# Patient Record
Sex: FEMALE | Race: WHITE | Employment: FULL TIME | URBAN - METROPOLITAN AREA
[De-identification: names, ages, dates, MRNs, and addresses within clinical notes are randomized per-mention and may not be internally consistent; named-entity substitution may affect disease eponyms.]

---

## 2021-11-02 ENCOUNTER — APPOINTMENT (OUTPATIENT)
Dept: CT IMAGING | Age: 46
DRG: 167 | End: 2021-11-02
Payer: COMMERCIAL

## 2021-11-02 ENCOUNTER — HOSPITAL ENCOUNTER (INPATIENT)
Age: 46
LOS: 2 days | Discharge: HOME | DRG: 167 | End: 2021-11-04
Attending: EMERGENCY MEDICINE | Admitting: INTERNAL MEDICINE
Payer: COMMERCIAL

## 2021-11-02 DIAGNOSIS — I26.02 ACUTE SADDLE PULMONARY EMBOLISM WITH ACUTE COR PULMONALE (HCC): Primary | ICD-10-CM

## 2021-11-02 PROBLEM — I10 ESSENTIAL HYPERTENSION: Status: ACTIVE | Noted: 2021-11-02

## 2021-11-02 PROBLEM — R00.0 TACHYCARDIA: Status: ACTIVE | Noted: 2021-11-02

## 2021-11-02 LAB
A/G RATIO: 1.1 (ref 1.1–2.2)
ALBUMIN SERPL-MCNC: 4.2 G/DL (ref 3.4–5)
ALP BLD-CCNC: 113 U/L (ref 40–129)
ALT SERPL-CCNC: 15 U/L (ref 10–40)
ANION GAP SERPL CALCULATED.3IONS-SCNC: 18 MMOL/L (ref 3–16)
APTT: 34 SEC (ref 26.2–38.6)
APTT: 38 SEC (ref 26.2–38.6)
AST SERPL-CCNC: 19 U/L (ref 15–37)
BASOPHILS ABSOLUTE: 0 K/UL (ref 0–0.2)
BASOPHILS RELATIVE PERCENT: 0.2 %
BILIRUB SERPL-MCNC: 0.5 MG/DL (ref 0–1)
BUN BLDV-MCNC: 13 MG/DL (ref 7–20)
CALCIUM SERPL-MCNC: 9 MG/DL (ref 8.3–10.6)
CHLORIDE BLD-SCNC: 97 MMOL/L (ref 99–110)
CO2: 15 MMOL/L (ref 21–32)
CREAT SERPL-MCNC: 0.7 MG/DL (ref 0.6–1.1)
EKG ATRIAL RATE: 149 BPM
EKG DIAGNOSIS: NORMAL
EKG P AXIS: 54 DEGREES
EKG P-R INTERVAL: 100 MS
EKG Q-T INTERVAL: 278 MS
EKG QRS DURATION: 80 MS
EKG QTC CALCULATION (BAZETT): 437 MS
EKG R AXIS: 11 DEGREES
EKG T AXIS: -32 DEGREES
EKG VENTRICULAR RATE: 149 BPM
EOSINOPHILS ABSOLUTE: 0.1 K/UL (ref 0–0.6)
EOSINOPHILS RELATIVE PERCENT: 0.7 %
FIBRINOGEN: 374 MG/DL (ref 200–397)
FIBRINOGEN: 400 MG/DL (ref 200–397)
GFR AFRICAN AMERICAN: >60
GFR NON-AFRICAN AMERICAN: >60
GLUCOSE BLD-MCNC: 173 MG/DL (ref 70–99)
HCG QUALITATIVE: NEGATIVE
HCT VFR BLD CALC: 36.2 % (ref 36–48)
HCT VFR BLD CALC: 42.4 % (ref 36–48)
HEMOGLOBIN: 11.6 G/DL (ref 12–16)
HEMOGLOBIN: 13.7 G/DL (ref 12–16)
INR BLD: 1 (ref 0.88–1.12)
LACTIC ACID: 2.4 MMOL/L (ref 0.4–2)
LACTIC ACID: 3.6 MMOL/L (ref 0.4–2)
LV EF: 70 %
LVEF MODALITY: NORMAL
LYMPHOCYTES ABSOLUTE: 2.7 K/UL (ref 1–5.1)
LYMPHOCYTES RELATIVE PERCENT: 14.1 %
MCH RBC QN AUTO: 26.9 PG (ref 26–34)
MCH RBC QN AUTO: 27.1 PG (ref 26–34)
MCHC RBC AUTO-ENTMCNC: 32.2 G/DL (ref 31–36)
MCHC RBC AUTO-ENTMCNC: 32.3 G/DL (ref 31–36)
MCV RBC AUTO: 83.5 FL (ref 80–100)
MCV RBC AUTO: 84 FL (ref 80–100)
MONOCYTES ABSOLUTE: 0.8 K/UL (ref 0–1.3)
MONOCYTES RELATIVE PERCENT: 4.1 %
NEUTROPHILS ABSOLUTE: 15.6 K/UL (ref 1.7–7.7)
NEUTROPHILS RELATIVE PERCENT: 80.9 %
PDW BLD-RTO: 14.9 % (ref 12.4–15.4)
PDW BLD-RTO: 14.9 % (ref 12.4–15.4)
PLATELET # BLD: 214 K/UL (ref 135–450)
PLATELET # BLD: 249 K/UL (ref 135–450)
PMV BLD AUTO: 7.4 FL (ref 5–10.5)
PMV BLD AUTO: 7.7 FL (ref 5–10.5)
POTASSIUM REFLEX MAGNESIUM: 4.4 MMOL/L (ref 3.5–5.1)
PRO-BNP: 465 PG/ML (ref 0–124)
PROTHROMBIN TIME: 11.3 SEC (ref 9.9–12.7)
RBC # BLD: 4.33 M/UL (ref 4–5.2)
RBC # BLD: 5.05 M/UL (ref 4–5.2)
SARS-COV-2, NAAT: NOT DETECTED
SODIUM BLD-SCNC: 130 MMOL/L (ref 136–145)
TOTAL PROTEIN: 8 G/DL (ref 6.4–8.2)
TROPONIN: 0.08 NG/ML
TSH REFLEX: 1.36 UIU/ML (ref 0.27–4.2)
WBC # BLD: 13.6 K/UL (ref 4–11)
WBC # BLD: 19.2 K/UL (ref 4–11)

## 2021-11-02 PROCEDURE — 2580000003 HC RX 258: Performed by: PHYSICIAN ASSISTANT

## 2021-11-02 PROCEDURE — 02FQ3Z0 FRAGMENTATION OF RIGHT PULMONARY ARTERY, PERCUTANEOUS APPROACH, ULTRASONIC: ICD-10-PCS | Performed by: SURGERY

## 2021-11-02 PROCEDURE — C1894 INTRO/SHEATH, NON-LASER: HCPCS

## 2021-11-02 PROCEDURE — 80053 COMPREHEN METABOLIC PANEL: CPT

## 2021-11-02 PROCEDURE — 3E03317 INTRODUCTION OF OTHER THROMBOLYTIC INTO PERIPHERAL VEIN, PERCUTANEOUS APPROACH: ICD-10-PCS | Performed by: SURGERY

## 2021-11-02 PROCEDURE — 85027 COMPLETE CBC AUTOMATED: CPT

## 2021-11-02 PROCEDURE — 06HM33Z INSERTION OF INFUSION DEVICE INTO RIGHT FEMORAL VEIN, PERCUTANEOUS APPROACH: ICD-10-PCS | Performed by: SURGERY

## 2021-11-02 PROCEDURE — 2709999900 HC NON-CHARGEABLE SUPPLY

## 2021-11-02 PROCEDURE — B31S1ZZ FLUOROSCOPY OF RIGHT PULMONARY ARTERY USING LOW OSMOLAR CONTRAST: ICD-10-PCS | Performed by: SURGERY

## 2021-11-02 PROCEDURE — 99152 MOD SED SAME PHYS/QHP 5/>YRS: CPT

## 2021-11-02 PROCEDURE — C1751 CATH, INF, PER/CENT/MIDLINE: HCPCS

## 2021-11-02 PROCEDURE — 36415 COLL VENOUS BLD VENIPUNCTURE: CPT

## 2021-11-02 PROCEDURE — 93005 ELECTROCARDIOGRAM TRACING: CPT | Performed by: EMERGENCY MEDICINE

## 2021-11-02 PROCEDURE — B54BZZA ULTRASONOGRAPHY OF RIGHT LOWER EXTREMITY VEINS, GUIDANCE: ICD-10-PCS | Performed by: SURGERY

## 2021-11-02 PROCEDURE — 85384 FIBRINOGEN ACTIVITY: CPT

## 2021-11-02 PROCEDURE — 6360000002 HC RX W HCPCS: Performed by: SURGERY

## 2021-11-02 PROCEDURE — 2100000000 HC CCU R&B

## 2021-11-02 PROCEDURE — 99283 EMERGENCY DEPT VISIT LOW MDM: CPT

## 2021-11-02 PROCEDURE — 6370000000 HC RX 637 (ALT 250 FOR IP): Performed by: SURGERY

## 2021-11-02 PROCEDURE — 75746 ARTERY X-RAYS LUNG: CPT | Performed by: SURGERY

## 2021-11-02 PROCEDURE — 85730 THROMBOPLASTIN TIME PARTIAL: CPT

## 2021-11-02 PROCEDURE — 75820 VEIN X-RAY ARM/LEG: CPT

## 2021-11-02 PROCEDURE — 99153 MOD SED SAME PHYS/QHP EA: CPT

## 2021-11-02 PROCEDURE — 93010 ELECTROCARDIOGRAM REPORT: CPT | Performed by: INTERNAL MEDICINE

## 2021-11-02 PROCEDURE — APPSS60 APP SPLIT SHARED TIME 46-60 MINUTES: Performed by: NURSE PRACTITIONER

## 2021-11-02 PROCEDURE — 75746 ARTERY X-RAYS LUNG: CPT

## 2021-11-02 PROCEDURE — 6360000002 HC RX W HCPCS: Performed by: PHYSICIAN ASSISTANT

## 2021-11-02 PROCEDURE — 83880 ASSAY OF NATRIURETIC PEPTIDE: CPT

## 2021-11-02 PROCEDURE — 36014 PLACE CATHETER IN ARTERY: CPT | Performed by: SURGERY

## 2021-11-02 PROCEDURE — 84443 ASSAY THYROID STIM HORMONE: CPT

## 2021-11-02 PROCEDURE — 83605 ASSAY OF LACTIC ACID: CPT

## 2021-11-02 PROCEDURE — 2500000003 HC RX 250 WO HCPCS: Performed by: EMERGENCY MEDICINE

## 2021-11-02 PROCEDURE — 2500000003 HC RX 250 WO HCPCS

## 2021-11-02 PROCEDURE — U0003 INFECTIOUS AGENT DETECTION BY NUCLEIC ACID (DNA OR RNA); SEVERE ACUTE RESPIRATORY SYNDROME CORONAVIRUS 2 (SARS-COV-2) (CORONAVIRUS DISEASE [COVID-19]), AMPLIFIED PROBE TECHNIQUE, MAKING USE OF HIGH THROUGHPUT TECHNOLOGIES AS DESCRIBED BY CMS-2020-01-R: HCPCS

## 2021-11-02 PROCEDURE — 96361 HYDRATE IV INFUSION ADD-ON: CPT

## 2021-11-02 PROCEDURE — 96374 THER/PROPH/DIAG INJ IV PUSH: CPT

## 2021-11-02 PROCEDURE — 6360000002 HC RX W HCPCS

## 2021-11-02 PROCEDURE — 99152 MOD SED SAME PHYS/QHP 5/>YRS: CPT | Performed by: SURGERY

## 2021-11-02 PROCEDURE — 2580000003 HC RX 258: Performed by: EMERGENCY MEDICINE

## 2021-11-02 PROCEDURE — 71260 CT THORAX DX C+: CPT

## 2021-11-02 PROCEDURE — 84703 CHORIONIC GONADOTROPIN ASSAY: CPT

## 2021-11-02 PROCEDURE — 99253 IP/OBS CNSLTJ NEW/EST LOW 45: CPT | Performed by: SURGERY

## 2021-11-02 PROCEDURE — 6360000002 HC RX W HCPCS: Performed by: INTERNAL MEDICINE

## 2021-11-02 PROCEDURE — 6360000004 HC RX CONTRAST MEDICATION: Performed by: SURGERY

## 2021-11-02 PROCEDURE — 84484 ASSAY OF TROPONIN QUANT: CPT

## 2021-11-02 PROCEDURE — 87635 SARS-COV-2 COVID-19 AMP PRB: CPT

## 2021-11-02 PROCEDURE — 93306 TTE W/DOPPLER COMPLETE: CPT

## 2021-11-02 PROCEDURE — 36013 PLACE CATHETER IN ARTERY: CPT

## 2021-11-02 PROCEDURE — U0005 INFEC AGEN DETEC AMPLI PROBE: HCPCS

## 2021-11-02 PROCEDURE — 6370000000 HC RX 637 (ALT 250 FOR IP): Performed by: INTERNAL MEDICINE

## 2021-11-02 PROCEDURE — 02FR3Z0 FRAGMENTATION OF LEFT PULMONARY ARTERY, PERCUTANEOUS APPROACH, ULTRASONIC: ICD-10-PCS | Performed by: SURGERY

## 2021-11-02 PROCEDURE — B31T1ZZ FLUOROSCOPY OF LEFT PULMONARY ARTERY USING LOW OSMOLAR CONTRAST: ICD-10-PCS | Performed by: SURGERY

## 2021-11-02 PROCEDURE — 2580000003 HC RX 258

## 2021-11-02 PROCEDURE — APPNB30 APP NON BILLABLE TIME 0-30 MINS: Performed by: NURSE PRACTITIONER

## 2021-11-02 PROCEDURE — 37211 THROMBOLYTIC ART THERAPY: CPT

## 2021-11-02 PROCEDURE — 37211 THROMBOLYTIC ART THERAPY: CPT | Performed by: SURGERY

## 2021-11-02 PROCEDURE — 76937 US GUIDE VASCULAR ACCESS: CPT | Performed by: SURGERY

## 2021-11-02 PROCEDURE — 2000000000 HC ICU R&B

## 2021-11-02 PROCEDURE — 85610 PROTHROMBIN TIME: CPT

## 2021-11-02 PROCEDURE — 6360000004 HC RX CONTRAST MEDICATION: Performed by: EMERGENCY MEDICINE

## 2021-11-02 PROCEDURE — 96375 TX/PRO/DX INJ NEW DRUG ADDON: CPT

## 2021-11-02 PROCEDURE — 85025 COMPLETE CBC W/AUTO DIFF WBC: CPT

## 2021-11-02 PROCEDURE — C1769 GUIDE WIRE: HCPCS

## 2021-11-02 PROCEDURE — 2580000003 HC RX 258: Performed by: SURGERY

## 2021-11-02 RX ORDER — HEPARIN SODIUM 10000 [USP'U]/100ML
5-30 INJECTION, SOLUTION INTRAVENOUS CONTINUOUS
Status: CANCELLED | OUTPATIENT
Start: 2021-11-02

## 2021-11-02 RX ORDER — HEPARIN SODIUM 1000 [USP'U]/ML
80 INJECTION, SOLUTION INTRAVENOUS; SUBCUTANEOUS ONCE
Status: COMPLETED | OUTPATIENT
Start: 2021-11-02 | End: 2021-11-02

## 2021-11-02 RX ORDER — DILTIAZEM HYDROCHLORIDE 5 MG/ML
INJECTION INTRAVENOUS
Status: DISCONTINUED
Start: 2021-11-02 | End: 2021-11-02

## 2021-11-02 RX ORDER — SODIUM CHLORIDE 9 MG/ML
INJECTION, SOLUTION INTRAVENOUS CONTINUOUS
Status: DISCONTINUED | OUTPATIENT
Start: 2021-11-02 | End: 2021-11-03

## 2021-11-02 RX ORDER — ACETAMINOPHEN 325 MG/1
650 TABLET ORAL EVERY 6 HOURS PRN
Status: DISCONTINUED | OUTPATIENT
Start: 2021-11-02 | End: 2021-11-04 | Stop reason: HOSPADM

## 2021-11-02 RX ORDER — HEPARIN SODIUM 10000 [USP'U]/100ML
5-30 INJECTION, SOLUTION INTRAVENOUS CONTINUOUS
Status: DISCONTINUED | OUTPATIENT
Start: 2021-11-02 | End: 2021-11-02

## 2021-11-02 RX ORDER — HEPARIN SODIUM 1000 [USP'U]/ML
40 INJECTION, SOLUTION INTRAVENOUS; SUBCUTANEOUS PRN
Status: DISCONTINUED | OUTPATIENT
Start: 2021-11-02 | End: 2021-11-04

## 2021-11-02 RX ORDER — 0.9 % SODIUM CHLORIDE 0.9 %
1000 INTRAVENOUS SOLUTION INTRAVENOUS ONCE
Status: COMPLETED | OUTPATIENT
Start: 2021-11-02 | End: 2021-11-02

## 2021-11-02 RX ORDER — MORPHINE SULFATE 4 MG/ML
4 INJECTION, SOLUTION INTRAMUSCULAR; INTRAVENOUS
Status: DISCONTINUED | OUTPATIENT
Start: 2021-11-02 | End: 2021-11-04 | Stop reason: HOSPADM

## 2021-11-02 RX ORDER — HEPARIN SODIUM 1000 [USP'U]/ML
80 INJECTION, SOLUTION INTRAVENOUS; SUBCUTANEOUS PRN
Status: DISCONTINUED | OUTPATIENT
Start: 2021-11-02 | End: 2021-11-02

## 2021-11-02 RX ORDER — HEPARIN SODIUM 1000 [USP'U]/ML
80 INJECTION, SOLUTION INTRAVENOUS; SUBCUTANEOUS PRN
Status: CANCELLED | OUTPATIENT
Start: 2021-11-02

## 2021-11-02 RX ORDER — ONDANSETRON 2 MG/ML
4 INJECTION INTRAMUSCULAR; INTRAVENOUS EVERY 30 MIN PRN
Status: DISCONTINUED | OUTPATIENT
Start: 2021-11-02 | End: 2021-11-02

## 2021-11-02 RX ORDER — CETIRIZINE HYDROCHLORIDE 10 MG/1
10 TABLET ORAL DAILY
COMMUNITY

## 2021-11-02 RX ORDER — HEPARIN SODIUM 10000 [USP'U]/100ML
250 INJECTION, SOLUTION INTRAVENOUS CONTINUOUS
Status: DISCONTINUED | OUTPATIENT
Start: 2021-11-02 | End: 2021-11-04

## 2021-11-02 RX ORDER — ONDANSETRON 2 MG/ML
4 INJECTION INTRAMUSCULAR; INTRAVENOUS EVERY 6 HOURS PRN
Status: DISCONTINUED | OUTPATIENT
Start: 2021-11-02 | End: 2021-11-04 | Stop reason: HOSPADM

## 2021-11-02 RX ORDER — HYDROCODONE BITARTRATE AND ACETAMINOPHEN 5; 325 MG/1; MG/1
1 TABLET ORAL EVERY 6 HOURS PRN
Status: DISCONTINUED | OUTPATIENT
Start: 2021-11-02 | End: 2021-11-04 | Stop reason: HOSPADM

## 2021-11-02 RX ORDER — DILTIAZEM HYDROCHLORIDE 5 MG/ML
0.25 INJECTION INTRAVENOUS ONCE
Status: COMPLETED | OUTPATIENT
Start: 2021-11-02 | End: 2021-11-02

## 2021-11-02 RX ORDER — SODIUM CHLORIDE 0.9 % (FLUSH) 0.9 %
5-40 SYRINGE (ML) INJECTION PRN
Status: DISCONTINUED | OUTPATIENT
Start: 2021-11-02 | End: 2021-11-04 | Stop reason: HOSPADM

## 2021-11-02 RX ORDER — SODIUM CHLORIDE 0.9 % (FLUSH) 0.9 %
5-40 SYRINGE (ML) INJECTION EVERY 12 HOURS SCHEDULED
Status: DISCONTINUED | OUTPATIENT
Start: 2021-11-02 | End: 2021-11-04 | Stop reason: HOSPADM

## 2021-11-02 RX ORDER — LISINOPRIL 20 MG/1
20 TABLET ORAL DAILY
Status: DISCONTINUED | OUTPATIENT
Start: 2021-11-03 | End: 2021-11-04 | Stop reason: HOSPADM

## 2021-11-02 RX ORDER — HEPARIN SODIUM 1000 [USP'U]/ML
40 INJECTION, SOLUTION INTRAVENOUS; SUBCUTANEOUS PRN
Status: DISCONTINUED | OUTPATIENT
Start: 2021-11-02 | End: 2021-11-02

## 2021-11-02 RX ORDER — SODIUM CHLORIDE 9 MG/ML
25 INJECTION, SOLUTION INTRAVENOUS PRN
Status: DISCONTINUED | OUTPATIENT
Start: 2021-11-02 | End: 2021-11-04 | Stop reason: HOSPADM

## 2021-11-02 RX ORDER — ACETAMINOPHEN 650 MG/1
650 SUPPOSITORY RECTAL EVERY 6 HOURS PRN
Status: DISCONTINUED | OUTPATIENT
Start: 2021-11-02 | End: 2021-11-04 | Stop reason: HOSPADM

## 2021-11-02 RX ORDER — HYDROCODONE BITARTRATE AND ACETAMINOPHEN 5; 325 MG/1; MG/1
1 TABLET ORAL EVERY 6 HOURS PRN
Status: DISCONTINUED | OUTPATIENT
Start: 2021-11-02 | End: 2021-11-02

## 2021-11-02 RX ORDER — POLYETHYLENE GLYCOL 3350 17 G/17G
17 POWDER, FOR SOLUTION ORAL DAILY PRN
Status: DISCONTINUED | OUTPATIENT
Start: 2021-11-02 | End: 2021-11-04 | Stop reason: HOSPADM

## 2021-11-02 RX ORDER — LISINOPRIL 20 MG/1
20 TABLET ORAL DAILY
COMMUNITY
Start: 2021-09-10

## 2021-11-02 RX ORDER — HEPARIN SODIUM 1000 [USP'U]/ML
40 INJECTION, SOLUTION INTRAVENOUS; SUBCUTANEOUS PRN
Status: CANCELLED | OUTPATIENT
Start: 2021-11-02

## 2021-11-02 RX ORDER — ONDANSETRON 4 MG/1
4 TABLET, ORALLY DISINTEGRATING ORAL EVERY 8 HOURS PRN
Status: DISCONTINUED | OUTPATIENT
Start: 2021-11-02 | End: 2021-11-04 | Stop reason: HOSPADM

## 2021-11-02 RX ORDER — HEPARIN SODIUM 10000 [USP'U]/100ML
5-30 INJECTION, SOLUTION INTRAVENOUS CONTINUOUS
Status: DISCONTINUED | OUTPATIENT
Start: 2021-11-03 | End: 2021-11-04

## 2021-11-02 RX ORDER — MORPHINE SULFATE 2 MG/ML
2 INJECTION, SOLUTION INTRAMUSCULAR; INTRAVENOUS
Status: DISCONTINUED | OUTPATIENT
Start: 2021-11-02 | End: 2021-11-04 | Stop reason: HOSPADM

## 2021-11-02 RX ADMIN — SODIUM CHLORIDE: 9 INJECTION, SOLUTION INTRAVENOUS at 19:52

## 2021-11-02 RX ADMIN — SODIUM CHLORIDE: 9 INJECTION, SOLUTION INTRAVENOUS at 16:55

## 2021-11-02 RX ADMIN — SODIUM CHLORIDE 1000 ML: 9 INJECTION, SOLUTION INTRAVENOUS at 11:52

## 2021-11-02 RX ADMIN — IOPAMIDOL 75 ML: 755 INJECTION, SOLUTION INTRAVENOUS at 12:40

## 2021-11-02 RX ADMIN — HYDROCODONE BITARTRATE AND ACETAMINOPHEN 1 TABLET: 5; 325 TABLET ORAL at 21:06

## 2021-11-02 RX ADMIN — MORPHINE SULFATE 2 MG: 2 INJECTION, SOLUTION INTRAMUSCULAR; INTRAVENOUS at 22:57

## 2021-11-02 RX ADMIN — ONDANSETRON 4 MG: 2 INJECTION INTRAMUSCULAR; INTRAVENOUS at 10:34

## 2021-11-02 RX ADMIN — ONDANSETRON 4 MG: 4 TABLET, ORALLY DISINTEGRATING ORAL at 21:10

## 2021-11-02 RX ADMIN — IOPAMIDOL 30 ML: 755 INJECTION, SOLUTION INTRAVENOUS at 17:06

## 2021-11-02 RX ADMIN — SODIUM CHLORIDE: 9 INJECTION, SOLUTION INTRAVENOUS at 17:01

## 2021-11-02 RX ADMIN — HEPARIN SODIUM 6350 UNITS: 1000 INJECTION INTRAVENOUS; SUBCUTANEOUS at 13:59

## 2021-11-02 RX ADMIN — DILTIAZEM HYDROCHLORIDE 20 MG: 5 INJECTION INTRAVENOUS at 11:23

## 2021-11-02 RX ADMIN — HEPARIN SODIUM AND DEXTROSE 250 UNITS/HR: 10000; 5 INJECTION INTRAVENOUS at 17:00

## 2021-11-02 RX ADMIN — ALTEPLASE 1 MG: KIT at 17:15

## 2021-11-02 RX ADMIN — ACETAMINOPHEN 650 MG: 325 TABLET ORAL at 19:48

## 2021-11-02 RX ADMIN — SODIUM CHLORIDE 1000 ML: 9 INJECTION, SOLUTION INTRAVENOUS at 10:32

## 2021-11-02 ASSESSMENT — PAIN DESCRIPTION - PAIN TYPE
TYPE: ACUTE PAIN

## 2021-11-02 ASSESSMENT — PAIN DESCRIPTION - LOCATION
LOCATION: BACK

## 2021-11-02 ASSESSMENT — ENCOUNTER SYMPTOMS
RHINORRHEA: 0
SORE THROAT: 0
DIARRHEA: 0
ABDOMINAL PAIN: 0
VOMITING: 0
COUGH: 0
NAUSEA: 1
BACK PAIN: 0
EYE PAIN: 0
SHORTNESS OF BREATH: 0
CONSTIPATION: 0

## 2021-11-02 ASSESSMENT — PAIN SCALES - GENERAL
PAINLEVEL_OUTOF10: 8
PAINLEVEL_OUTOF10: 9
PAINLEVEL_OUTOF10: 8
PAINLEVEL_OUTOF10: 8

## 2021-11-02 NOTE — PROGRESS NOTES
Gerald Walters   Patient: Ivon Singleton     11/2/21 6:45 PM   [phone] Hospital or Facility: [facility] From: Marissa Bejarano RE: Rita Todd RM: [id number] Patient just came from cath lab with EKOS. Patient complaining of back pain due to lying flat on back. Nothing ordered for pain.  Need Callback: NEEDS CALLBACK CVU  Read 6:53 PM

## 2021-11-02 NOTE — CONSULTS
[facility]  Consultation Note    Chief Complaint / Reason for Consultation  Saddle PE    History of Present Illness  Patient is a 42 y.o. female with past medical history of hypertension who presented to the ED with complaints of SOB, dizziness, nausea and fast heart beat. Patient reports this weekend she was not feeling well and laying around more. Today when she woke up she began sweating and was dizzy along with nausea. She complains of some dyspnea with exertion and chest discomfort. She reports her heart felt like it was beating very fast.  She also reports some right groin and thigh pain this morning as well. Patient does report lower extremity swelling but relates it to being on her feet for her job on a daily bases. She denies any recent surgery or travel. She denies any personal or family history of blood clots or clotting disorders. She denies tobacco use. She denies any current estrogen use, was on previously. She has not been vaccinated against COVID-19. In ED, CTPE chest showed acute PE with saddle embolus across the main pulmonary artery with evidence of RV strain. We have been consulted for further evaluation and recommendations. Review of Systems  + nausea, + SOB, + dizziness, + heart palpitations. Denies fevers, chills, vomiting, hematemesis, diarrhea, constipation, melena, hematochezia, wt changes, vision problems, blindness, hearing problems, facial droop, slurred speech, extremity weakness, extremity numbness, dysuria. Past Medical History:   Diagnosis Date    Hypertension        History reviewed. No pertinent surgical history.     No Known Allergies    Social History     Socioeconomic History    Marital status:      Spouse name: Not on file    Number of children: Not on file    Years of education: Not on file    Highest education level: Not on file   Occupational History    Not on file   Tobacco Use    Smoking status: Never Smoker Substance and Sexual Activity    Alcohol use: Not Currently    Drug use: Never    Sexual activity: Not on file   Other Topics Concern    Not on file   Social History Narrative    Not on file     Social Determinants of Health     Financial Resource Strain:     Difficulty of Paying Living Expenses:    Food Insecurity:     Worried About Running Out of Food in the Last Year:     [address] in the Last Year:    Transportation Needs:     Lack of Transportation (Medical):  Lack of Transportation (Non-Medical):    Physical Activity:     Days of Exercise per Week:     Minutes of Exercise per Session:    Stress:     Feeling of Stress :    Social Connections:     Frequency of Communication with Friends and Family:     Frequency of Social Gatherings with Friends and Family:     Attends [employer]oration[employer] Services:     Active Member of Clubs or Organizations:     Attends Club or Organization Meetings:     Marital Status:    Intimate Partner Violence:     Fear of Current or Ex-Partner:     Emotionally Abused:     Physically Abused:     Sexually Abused:        History reviewed.  No pertinent family history.  - No history of bleeding or clotting disorders    Vital Signs  Vitals:    11/02/21 1130 11/02/21 1145 11/02/21 1200 11/02/21 1215   BP: 104/79 104/69 98/76 106/78   Pulse: 138 136 132 130   Resp: (!) 32 26 27 26   Temp:       SpO2: 97% 93% 95% 97%   Weight:       Height:           Physical Examination  General: no apparent distress, appears stated age  Psychiatric: affect appropriate  Head/Eyes/Ears/Nose/Throat:  Normocephalic, atraumatic, PERRLA, face symmetric  Neck:  no adenopathy, no carotid bruit, no JVD, supple, symmetrical, trachea midline, thyroid not enlarged, no tenderness/mass/nodules  Chest/Lungs: clear to auscultation bilaterally, no accessory muscle use  Cardiac:  sinus tachycardia, S1, S2 normal, no murmur, click, rub or gallop  Abdomen: soft, nontender, active bowel sounds  Extremities: warm and well perfused, no signs of cyanosis or ischemia, trace BLE edema, bilateral upper and lower extremity motorsensory intact  Vascular exam:  - R radial: 2+  - L radial: 2+  - R femoral: 2+  - L femoral: 2+  - R PT: 2+  - L PT: 2+    Labs  Lab Results   Component Value Date    WBC 19.2 11/02/2021    HGB 13.7 11/02/2021    HCT 42.4 11/02/2021    MCV 84.0 11/02/2021     11/02/2021     Lab Results   Component Value Date     11/02/2021    K 4.4 11/02/2021    CL 97 11/02/2021    CO2 15 11/02/2021    BUN 13 11/02/2021    CREATININE 0.7 11/02/2021      No components found for: GLU    Scheduled Meds:    dilTIAZem        heparin (porcine)  80 Units/kg IntraVENous Once     Continuous Infusions:    heparin (PORCINE) Infusion         Imaging:     CTPE chest 11/2/21:  Impression   Acute pulmonary embolism with a saddle embolus noted across the main   pulmonary artery.       Evidence of right heart strain with the LV to RV ratio 1.7.       No evidence of acute lung parenchyma or pleural disease. Assessment:   Acute saddle PE with RV strain - sinus tachycardia 130s, on room air, no hypotension at this time; appears unprovoked   BLE swelling, right groin/thigh pain    Plan: Will give initial heparin weight based bolus. Hold on starting heparin drip. Will plan for catheter directed thrombolysis (EKOS) this afternoon with Dr. Mellisa Rivers. Keep NPO. ECHO ordered. Will send rapid COVID test and pregnancy test.  Add on fibrinogen level. BLE venous duplex ordered. Will need CVU bed. Plan discussed with Dr. Mellisa Rivers. Thank you for the consultation. Patient educated on plan of care and disease process. All questions answered. Electronically signed by SANDRA Caldwell CNP on 11/2/2021 at 1:30 PM     VASCULAR STAFF  Seen and discussed with A Jakob CNP. Agree with above history, exam and assessment. Separate exam and evaluation performed.    IMP: Acute PE with RV strain and respiratory compromise  PLAN: Initiation of catheter directed EKOS thrombolysis. Pt understands and agrees to proceed.     Kj Julian

## 2021-11-02 NOTE — ED NOTES
Bed: 23  Expected date:   Expected time:   Means of arrival:   Comments:  Yana Faith RN  11/02/21 1027

## 2021-11-02 NOTE — ED NOTES
Pharmacy Medication History Note      List of current medications patient is taking is complete. Source of information: Self    Changes made to medication list:  Medications flagged for removal (include reason, ex. noncompliance):  none    Medications removed (include reason, ex. therapy complete or physician discontinued):  none    Medications added/doses adjusted:  Cetirizine 10 mg daily    Other notes (ex. Recent course of antibiotics, Coumadin dosing):  Denies use of other OTC or herbal medications. Last dose times updated. Betina KeyesD  ED Pharmacist [id number]  11/2/2021    No current facility-administered medications on file prior to encounter.      Current Outpatient Medications on File Prior to Encounter   Medication Sig Dispense Refill    lisinopril (PRINIVIL;ZESTRIL) 20 MG tablet Take 20 mg by mouth daily       Norethin Ace-Eth Estrad-FE (SHARRI FE 1/20 PO) Take 1 tablet by mouth daily      cetirizine (ZYRTEC) 10 MG tablet Take 10 mg by mouth daily

## 2021-11-02 NOTE — ED PROVIDER NOTES
[facility] [facility]        Pt Name: Michael Mcneal  MRN: [id number]  [place] 1975  Date of evaluation: 11/2/2021  Provider: Sharon Shipley PA-C  PCP: Cynthia Baig  Note Started: 1:40 PM EDT       I have seen and evaluated this patient with my supervising physician Derrick Perkins MD.      [address]       Chief Complaint   Patient presents with    Palpitations     states she was making breakfast this am, and became dizzy and sweaty, having palpitations    Dizziness         HISTORY OF PRESENT ILLNESS   (Location/Symptom, Timing/Onset, Context/Setting, Quality, Duration, Modifying Factors, Severity)  Note limiting factors. Chief Complaint: Dizziness and palpitations    Michael Mcneal is a 42 y.o. female who presents to the emergency department, the patient states that she wasn't feeling well over the weekend, she had a lot of increased sleep, today she was standing while she was cooking and she felt suddenly dizzy and very nauseated and very sweaty. She states that she had some mild chills, she does admit to some mild back pain. She states that the lightheadedness as if she was going to pass out. She also states that she felt her heart is racing or palpitating. Denies any recent runny nose, cough or congestion, denies any illnesses. Patient states that she is not vaccinated for COVID-19. She denies any chest pain, admits to very mild to moderate discomfort. States that she just doesn't feel well. Nursing Notes were all reviewed and agreed with or any disagreements were addressed in the HPI. REVIEW OF SYSTEMS    (2-9 systems for level 4, 10 or more for level 5)     Review of Systems   Constitutional: Positive for diaphoresis and fatigue. Negative for chills and fever. HENT: Negative for congestion, ear pain, rhinorrhea and sore throat. Eyes: Negative for pain and visual disturbance.    Respiratory: Negative for cough and shortness of breath. Cardiovascular: Positive for palpitations. Negative for chest pain and leg swelling. Gastrointestinal: Positive for nausea. Negative for abdominal pain, constipation, diarrhea and vomiting. Genitourinary: Negative for decreased urine volume, dysuria, frequency and urgency. Musculoskeletal: Negative for back pain and neck pain. Skin: Negative for rash and wound. Neurological: Negative for dizziness and light-headedness. PAST MEDICAL HISTORY     Past Medical History:   Diagnosis Date    Hypertension        SURGICAL HISTORY   History reviewed. No pertinent surgical history. CURRENTMEDICATIONS       Previous Medications    LISINOPRIL (PRINIVIL;ZESTRIL) 20 MG TABLET    Take 20 mg by mouth daily     NORETHIN ACE-ETH ESTRAD-FE (SHARRI FE 1/20 PO)    Take 1 tablet by mouth daily       ALLERGIES     Patient has no known allergies. FAMILYHISTORY     History reviewed. No pertinent family history. SOCIAL HISTORY       Social History     Socioeconomic History    Marital status:      Spouse name: None    Number of children: None    Years of education: None    Highest education level: None   Occupational History    None   Tobacco Use    Smoking status: Never Smoker   Substance and Sexual Activity    Alcohol use: Not Currently    Drug use: Never    Sexual activity: None   Other Topics Concern    None   Social History Narrative    None     Social Determinants of Health     Financial Resource Strain:     Difficulty of Paying Living Expenses:    Food Insecurity:     Worried About Running Out of Food in the Last Year:     Ran Out of Food in the Last Year:    Transportation Needs:     Lack of Transportation (Medical):      Lack of Transportation (Non-Medical):    Physical Activity:     Days of Exercise per Week:     Minutes of Exercise per Session:    Stress:     Feeling of Stress :    Social Connections:     Frequency of Communication with Friends and Family:     Frequency of Social Gatherings with Friends and Family:     Attends [employer]ism Services:     Active Member of Clubs or Organizations:     Attends Club or Organization Meetings:     Marital Status:    Intimate Partner Violence:     Fear of Current or Ex-Partner:     Emotionally Abused:     Physically Abused:     Sexually Abused:        SCREENINGS             PHYSICAL EXAM    (up to 7 for level 4, 8 or more for level 5)     ED Triage Vitals [11/02/21 1012]   BP Temp Temp src Pulse Resp SpO2 Height Weight   122/86 98 °F (36.7 °C) -- 147 18 98 % 5' 3\" (1.6 m) 175 lb (79.4 kg)       Physical Exam  Vitals and nursing note reviewed. Constitutional:       Appearance: Normal appearance. She is well-developed. She is not ill-appearing or diaphoretic. HENT:      Head: Normocephalic and atraumatic. Right Ear: External ear normal.      Left Ear: External ear normal.      Nose: Nose normal.   Eyes:      General:         Right eye: No discharge. Left eye: No discharge. Cardiovascular:      Rate and Rhythm: Regular rhythm. Tachycardia present. Heart sounds: Normal heart sounds. No murmur heard. No friction rub. No gallop. Pulmonary:      Effort: Pulmonary effort is normal. No respiratory distress. Breath sounds: Normal breath sounds. No stridor. No wheezing or rales. Chest:      Chest wall: No tenderness. Abdominal:      General: Bowel sounds are normal. There is no distension. Palpations: Abdomen is soft. There is no mass. Tenderness: There is no abdominal tenderness. There is no guarding or rebound. Musculoskeletal:         General: Normal range of motion. Cervical back: Normal range of motion and neck supple. Skin:     General: Skin is warm and dry. Coloration: Skin is not pale. Neurological:      General: No focal deficit present. Mental Status: She is alert and oriented to person, place, and time.    Psychiatric: Mood and Affect: Mood normal.         Behavior: Behavior normal.         DIAGNOSTIC RESULTS   LABS:    Labs Reviewed   CBC WITH AUTO DIFFERENTIAL - Abnormal; Notable for the following components:       Result Value    WBC 19.2 (*)     Neutrophils Absolute 15.6 (*)     All other components within normal limits    Narrative:     Performed at:  [facility] [email or web address], [email or web address]   Phone [phone]   COMPREHENSIVE METABOLIC PANEL W/ REFLEX TO MG FOR LOW K - Abnormal; Notable for the following components:    Sodium 130 (*)     Chloride 97 (*)     CO2 15 (*)     Anion Gap 18 (*)     Glucose 173 (*)     All other components within normal limits    Narrative:     Performed at:  [facility] [email or web address], [email or web address]   Phone [phone]   BRAIN NATRIURETIC PEPTIDE - Abnormal; Notable for the following components:    Pro- (*)     All other components within normal limits    Narrative:     Performed at:  [facility] [email or web address], [email or web address]   Phone [phone]   TROPONIN - Abnormal; Notable for the following components:    Troponin 0.08 (*)     All other components within normal limits    Narrative:     Performed at:  [facility] [email or web address], [email or web address]   Phone [phone]   LACTIC ACID, PLASMA - Abnormal; Notable for the following components:    Lactic Acid 3.6 (*)     All other components within normal limits    Narrative:     Performed at:  [facility] [email or web address], [email or web address]   Phone [phone]   HCG, SERUM, QUALITATIVE    Narrative:     Performed at:  [facility]  [email or web address], [email or web address]   Phone [phone]   PROTIME-INR    Narrative:     Performed at:  [facility]  [address], [place]   Phone [phone]   APTT    Narrative:     Performed at:  [facility]  [address], [place]   Phone [phone]   COVID-19   TSH WITH REFLEX   LACTIC ACID, PLASMA   APTT   APTT   APTT   FIBRINOGEN       When ordered, only abnormal lab results are displayed. All other labs were within normal range or not returned as of this dictation. EKG: When ordered, EKG's are interpreted by the Emergency Department Physician in the absence of a cardiologist.  Please see their note for interpretation of EKG. RADIOLOGY:   Non-plain film images such as CT, Ultrasound and MRI are read by the radiologist. Plain radiographic images are visualized andpreliminarily interpreted by the  ED Provider with the below findings:        Interpretation perthe Radiologist below, if available at the time of this note:    CT CHEST PULMONARY EMBOLISM W CONTRAST   Preliminary Result   Acute pulmonary embolism with a saddle embolus noted across the main   pulmonary artery. Evidence of right heart strain with the LV to RV ratio 1.7. No evidence of acute lung parenchyma or pleural disease. Findings discussed with Dr. Emeka Murguia on 11/02/2021 at 12:45 p.m.         VL Extremity Venous Bilateral    (Results Pending)     CT CHEST PULMONARY EMBOLISM W CONTRAST    Result Date: 11/2/2021  EXAMINATION: CTA OF THE CHEST 11/2/2021 11:32 am TECHNIQUE: CTA of the chest was performed after the administration of intravenous contrast.  Multiplanar reformatted images are provided for review. MIP images are provided for review. Dose modulation, iterative reconstruction, and/or weight based adjustment of the mA/kV was utilized to reduce the radiation dose to as low as reasonably achievable. COMPARISON: None.  HISTORY: ORDERING SYSTEM PROVIDED HISTORY: acute chest pain TECHNOLOGIST PROVIDED HISTORY: Reason for exam:->acute chest pain Decision Support Exception - unselect if not a suspected or confirmed emergency medical condition->Emergency Medical Condition (MA) Reason for Exam: acute chest pain Acuity: Unknown Type of Exam: Unknown FINDINGS: Pulmonary Arteries: Acute pulmonary embolism is noted with a saddle embolus across the main pulmonary artery from left to right. The main pulmonary artery is not enlarged; however, there is evidence of right heart strain with the ratio RV to LV of approximately 1.7. Mediastinum: No evidence of mediastinal lymphadenopathy. The heart demonstrates right heart strain with RV to LV ratio of 1.7. No evidence of pericardial disease. There is no acute abnormality of the thoracic aorta. Lungs/pleura: The lungs are without acute process. No focal consolidation or pulmonary edema. No evidence of pleural effusion or pneumothorax. Upper Abdomen: Limited images of the upper abdomen are unremarkable. Soft Tissues/Bones: No acute bone or soft tissue abnormality. Acute pulmonary embolism with a saddle embolus noted across the main pulmonary artery. Evidence of right heart strain with the LV to RV ratio 1.7. No evidence of acute lung parenchyma or pleural disease. Findings discussed with Dr. Mary Gonzalez on 11/02/2021 at 12:45 p.m. PROCEDURES   Unless otherwise noted below, none     Procedures    CRITICAL CARE TIME     Critical Care  There was a high probability of life-threatening clinical deterioration in the patient's condition requiring my urgent intervention. Total critical care time with the patient was 45 minutes excluding separately reportable procedures.   Critical care required due to patients tachycardia and saddle embolus with right heart strain      CONSULTS:  IP CONSULT TO VASCULAR SURGERY  the nurse practitioner from vascular surgery consulted with Dr. Lyndsey Bradford, she came to the emergency department expeditiously, evaluated the patient, we elected to do heparin with EKOS catheter TPA directed later today    Ze [place] and DIFFERENTIAL DIAGNOSIS/MDM:   Vitals:    Vitals:    11/02/21 1130 11/02/21 1145 11/02/21 1200 11/02/21 1215   BP: 104/79 104/69 98/76 106/78   Pulse: 138 136 132 130   Resp: (!) 32 26 27 26   Temp:       SpO2: 97% 93% 95% 97%   Weight:       Height:           Patient was given thefollowing medications:  Medications   ondansetron (ZOFRAN) injection 4 mg (4 mg IntraVENous Given 11/2/21 1034)   dilTIAZem 25 MG/5ML injection (  Canceled Entry 11/2/21 1153)   heparin (porcine) injection 6,350 Units (has no administration in time range)   heparin (porcine) injection 6,350 Units (has no administration in time range)   heparin (porcine) injection 3,180 Units (has no administration in time range)   heparin 25,000 units in dextrose 5% 250 mL (premix) infusion (has no administration in time range)   0.9 % sodium chloride bolus (0 mLs IntraVENous Stopped 11/2/21 1152)   dilTIAZem injection 20 mg (20 mg IntraVENous Given 11/2/21 1123)   0.9 % sodium chloride IV bolus 1,000 mL (0 mLs IntraVENous Stopped 11/2/21 1245)   iopamidol (ISOVUE-370) 76 % injection 75 mL (75 mLs IntraVENous Given 11/2/21 1240)           Differential diagnosis includes sepsis versus mechanical obstruction such as pulmonary embolus. Also in the differential diagnosis is atrial flutter. Patient did not respond to the diltiazem, heart rate decreased only 10 bpm with the fluids, CT scan showed a large pulmonary embolus, saddle embolus with right heart strain. Consultation with vascular surgery, we will initiate heparin and patient will be admitted for EKOS catheter later today     FINAL IMPRESSION      1. Acute saddle pulmonary embolism with acute cor pulmonale [facility])          DISPOSITION/PLAN   DISPOSITION Decision To Admit 11/02/2021 12:48:03 PM      PATIENT REFERREDTO:  No follow-up provider specified.     DISCHARGE MEDICATIONS:  New Prescriptions    No medications on file       DISCONTINUED MEDICATIONS:  Discontinued Medications    No medications on file (Please note that portions ofthis note were completed with a voice recognition program.  Efforts were made to edit the dictations but occasionally words are mis-transcribed.)    Dinorah Coppola PA-C (electronically signed)             Dinorah Coppola PA-C  11/02/21 6123

## 2021-11-02 NOTE — ED PROVIDER NOTES
I independently performed a history and physical on Verna Munguia. All diagnostic, treatment, and disposition decisions were made by myself in conjunction with the advanced practice provider. I have participated in the medical decision making and directed the treatment plan and disposition of the patient. For further details of [facility] emergency department encounter, please see the advanced practice provider's documentation. CHIEF COMPLAINT  Chief Complaint   Patient presents with    Palpitations     states she was making breakfast this am, and became dizzy and sweaty, having palpitations    Dizziness     Briefly, Verna Munguia is a 42 y.o. female  who presents to the ED complaining of several days of feeling generally unwell with malaise and fatigue out of proportion to her normal self but acutely this morning while making breakfast she got dizzy sweaty lightheaded and had palpitations with some chest heaviness. She denies any actual pain per se in the chest though. She does feel short of breath. She is on birth control. No history of blood clots. No history of known COVID-19 illness. She arrives tachycardic and mildly tachypneic but not hypoxic. FOCUSED PHYSICAL EXAMINATION  BP 99/83   Pulse 131   Temp 98 °F (36.7 °C)   Resp 19   Ht 5' 3\" (1.6 m)   Wt 175 lb (79.4 kg)   LMP 10/17/2021   SpO2 97%   BMI 31.00 kg/m²    Focused physical examination notable for no acute distress, well-appearing, well-nourished, normal speech and mentation without obvious facial droop, no obvious rash. No obvious cranial nerve deficits on my initial exam.  No leg swelling on exam.  Abdomen soft nontender nondistended. Tachycardic, regular rhythm, shallow respirations but not significantly distressed and clear to auscultation bilaterally.     The 12 lead EKG was interpreted by me as follows:  Rate: tachycardia with a rate of 149  Rhythm: sinus  Axis: normal  Intervals: short MA, narrow QRS  ST segments: no ST elevations or depressions  T waves: inverted inferiorly  Non-specific T wave changes: present  Prior EKG comparison: No prior is currently available for comparison    MDM:  Diagnostic considerations included acute coronary syndrome, pulmonary embolism, COPD/asthma, pneumonia, sepsis, pericardial tamponade, pneumothorax, CHF, thoracic aortic dissection, anxiety    ED course was notable for saddle pulmonary embolism with cor pulmonale and a positive troponin with heart strain on imaging as well. On initial EKG I was concerned about the possibility of 2-1 atrial flutter given its persistent tachycardia in the 140-150 range however she had no response to diltiazem. Therefore further diagnostics were performed including the CT scan which demonstrated the saddle PE. Her risk factors include exogenous estrogen use and she has been tested for COVID-19 given her malaise over the past few days which could be a risk factor if this ends up being positive as well. Vascular surgery was consulted and recommended heparinization and EKOS, not TPA. During the patient's ED course, the patient was given:  Medications   ondansetron (ZOFRAN) injection 4 mg (4 mg IntraVENous Given 11/2/21 1034)   dilTIAZem 25 MG/5ML injection (  Canceled Entry 11/2/21 1153)   0.9 % sodium chloride bolus (0 mLs IntraVENous Stopped 11/2/21 1152)   dilTIAZem injection 20 mg (20 mg IntraVENous Given 11/2/21 1123)   0.9 % sodium chloride IV bolus 1,000 mL (0 mLs IntraVENous Stopped 11/2/21 1245)   iopamidol (ISOVUE-370) 76 % injection 75 mL (75 mLs IntraVENous Given 11/2/21 1240)   heparin (porcine) injection 6,350 Units (6,350 Units IntraVENous Given 11/2/21 1359)        CLINICAL IMPRESSION  1. Acute saddle pulmonary embolism with acute cor pulmonale (HCC)        DISPOSITION  Kim Jaramillo was admitted in fair condition. The plan is to admit to the hospital at this time under the hospitalist service.   Hospitalist accepted the patient and will take over the patient's care. The total critical care time spent while evaluating and treating this patient was 45 minutes. This excludes time spent doing separately billable procedures. This includes time at the bedside, data interpretation, medication management, obtaining critical history from collateral sources if the patient is unable to provide it directly, and physician consultation. Specifics of interventions taken and potentially life-threatening diagnostic considerations are listed above in the medical decision making. This chart was created using Dragon dictation software. Efforts were made by me to ensure accuracy, however some errors may be present due to limitations of this technology.             Calin Zamora MD  11/02/21 2016

## 2021-11-02 NOTE — ED NOTES
Discussed plan of care with dr Naomi Posey and Jair PANDA.   New orders placed     Gisel Watt RN  11/02/21 6361

## 2021-11-02 NOTE — H&P
Hospital Medicine History & Physical      PCP: Onel Nila    Date of Admission: 11/2/2021    Date of Service: Pt seen/examined on 11/2/2021  and Admitted to Inpatient with expected LOS greater than two midnights due to medical therapy. Chief Complaint:    Chief Complaint   Patient presents with    Palpitations     states she was making breakfast this am, and became dizzy and sweaty, having palpitations    Dizziness          History Of Present Illness: The patient is a 42 y.o. female with history of hypertension on contraceptive pill who presented with dizziness and associated nausea, diaphoresis with palpitations. No chest pains. No shortness of breath. She denies sedentary life, and at work she is on her feet all the time. He in the ER CT pulmonary angiogram done was noted for saddle pulmonary embolus with right heart strain. EKG was noted for sinus tachycardia. Initially this was thought to be A. fib/flutter and she received a bolus dose of diltiazem without any effect on the right. Blood pressure remained stable  Vascular was consulted who recommended bolus dose of heparin and will take the patient for EKOS this afternoon    Past Medical History:        Diagnosis Date    Hypertension        Past Surgical History:    History reviewed. No pertinent surgical history. Medications Prior to Admission:    Prior to Admission medications    Medication Sig Start Date End Date Taking? Authorizing Provider   lisinopril (PRINIVIL;ZESTRIL) 20 MG tablet Take 20 mg by mouth daily  9/10/21  Yes Historical Provider, MD   Norethin Ace-Eth Estrad-FE (SHARRI FE 1/20 PO) Take 1 tablet by mouth daily   Yes Historical Provider, MD   cetirizine (ZYRTEC) 10 MG tablet Take 10 mg by mouth daily   Yes Historical Provider, MD       Allergies:  Patient has no known allergies. Social History:  The patient currently lives with family    TOBACCO:   reports that she has never smoked.  She does not have any smokeless tobacco history on file. ETOH:   reports previous alcohol use. Family History:  Reviewed in detail and negative for DM, Early CAD, Cancer, CVA. Positive as follows:    History reviewed. No pertinent family history. REVIEW OF SYSTEMS:   Positive for dizziness, nausea, clamminess and sweaty, palpitations and as noted in the HPI. All other systems reviewed and negative. PHYSICAL EXAM:    BP 99/83   Pulse 131   Temp 98 °F (36.7 °C)   Resp 19   Ht 5' 3\" (1.6 m)   Wt 175 lb (79.4 kg)   LMP 10/17/2021   SpO2 97%   BMI 31.00 kg/m²     General appearance: No apparent distress appears stated age and cooperative. HEENT Normal cephalic, atraumatic without obvious deformity. Pupils equal, round, and reactive to light. Extra ocular muscles intact. Conjunctivae/corneas clear. Neck: Supple, No jugular venous distention/bruits. Trachea midline without thyromegaly or adenopathy with full range of motion. Lungs: Clear to auscultation, bilaterally without Rales/Wheezes/Rhonchi with good respiratory effort. Heart: Regular rate and rhythm with Normal S1/S2 without murmurs, rubs or gallops, point of maximum impulse non-displaced  Abdomen: Soft, non-tender or non-distended without rigidity or guarding and positive bowel sounds all four quadrants. Extremities: No clubbing, cyanosis, or edema bilaterally. Full range of motion without deformity and normal gait intact. Skin: Skin color, texture, turgor normal.  No rashes or lesions. Neurologic: Alert and oriented X 3, neurovascularly intact with sensory/motor intact upper extremities/lower extremities, bilaterally. Cranial nerves: II-XII intact, grossly non-focal.  Mental status: Alert, oriented, thought content appropriate.         CBC   Recent Labs     11/02/21  1025   WBC 19.2*   HGB 13.7   HCT 42.4         RENAL  Recent Labs     11/02/21  1025   *   K 4.4   CL 97*   CO2 15*   BUN 13   CREATININE 0.7     LFT'S  Recent Labs     11/02/21  1025 AST 19   ALT 15   BILITOT 0.5   ALKPHOS 113     COAG  Recent Labs     11/02/21  1025   INR 1.00     CARDIAC ENZYMES  Recent Labs     11/02/21  1025   TROPONINI 0.08*           Active Hospital Problems    Diagnosis Date Noted    Acute saddle pulmonary embolism with acute cor pulmonale (HCC) [I26.02] 11/02/2021    Tachycardia [R00.0] 11/02/2021    Essential hypertension [I10] 11/02/2021         ASSESSMENT/PLAN:  51-year-old lady with history of hypertension on antihypertensives who presented with dizziness and associated nausea and diaphoresis, palpitations found to have acute saddle PE with right heart strain, sinus tachycardia but no hemodynamic compromise    Plan:  -Patient received bolus dose of heparin in the ER and will be taken to the OR by vascular for EKOS  -We will hold off BP/rate control medications today given tachycardia may be compensatory  -Vascular consult on board  -Repeat troponins tomorrow for prognostication  -Anticoagulation post EKOS per vascular surgery  -We will admit to ICU  -Telemetry  -Repeat CBCs and CMP tomorrow  -As needed pain medication    DVT Prophylaxis: On heparin  Diet: Diet NPO Exceptions are: Sips of Water with Meds  Code Status: No Order         Danie Chiu MD    Thank you Francisco Reno for the opportunity to be involved in this patient's care. If you have any questions or concerns please feel free to contact me at [phone].

## 2021-11-02 NOTE — PRE SEDATION
Sedation Pre-Procedure Note    Patient Name: Kael Bowser   YOB: 1975  Room/Bed: JOSEPH/NONE  Medical Record Number: [id number]  Date: 11/2/2021   Time: 3:54 PM       Indication:  PE with stain    Consent: I have discussed with the patient and/or the patient representative the indication, alternatives, and the possible risks and/or complications of the planned procedure and the anesthesia methods. The patient and/or patient representative appear to understand and agree to proceed. Vital Signs:   Vitals:    11/02/21 1345   BP: 99/83   Pulse: 131   Resp: 19   Temp:    SpO2: 97%       Past Medical History:   has a past medical history of Hypertension. Past Surgical History:   has no past surgical history on file. Medications:   Scheduled Meds:    dilTIAZem         Continuous Infusions:   PRN Meds: ondansetron  Home Meds:   Prior to Admission medications    Medication Sig Start Date End Date Taking? Authorizing Provider   lisinopril (PRINIVIL;ZESTRIL) 20 MG tablet Take 20 mg by mouth daily  9/10/21  Yes Historical Provider, MD   Norethin Ace-Eth Estrad-FE (SHARRI FE 1/20 PO) Take 1 tablet by mouth daily   Yes Historical Provider, MD   cetirizine (ZYRTEC) 10 MG tablet Take 10 mg by mouth daily   Yes Historical Provider, MD     Coumadin Use Last 7 Days:  no  Antiplatelet drug therapy use last 7 days: no  Other anticoagulant use last 7 days: no  Additional Medication Information:  See above      Pre-Sedation Documentation and Exam:   I have personally completed a history, physical exam & review of systems for this patient (see notes).     Mallampati Airway Assessment:  Mallampati Class III - (soft palate & base of uvula are visible)    Prior History of Anesthesia Complications:   none    ASA Classification:  Class 3 - A patient with severe systemic disease that limits activity but is not incapacitating    Sedation/ Anesthesia Plan:   intravenous sedation    Medications Planned:   midazolam (Versed) intravenously and fentanyl intravenously    Patient is an appropriate candidate for plan of sedation: yes    Electronically signed by Anjel Hancock MD on 11/2/2021 at 3:54 PM

## 2021-11-02 NOTE — BRIEF OP NOTE
Brief Postoperative Note      Patient: Thuy August  YOB: 1975  MRN: [id number]    Date of Procedure:  11/2/2021  Pre-Op Diagnosis: PE    Post-Op Diagnosis: Same    Procedure: Pulmonary angiogram with insertion of B PA EKOS catheters with initiation of thrombolytics    Anesthesia: local; with sedation    Estimated Blood Loss (mL): Minimal    Complications: None    Specimens:   * Cannot find log *    Implants:  * No surgical log found *      Drains: * No LDAs found *    Findings: B PE    Electronically signed by Gabe Sibley MD on 11/2/2021 at 5:15 PM

## 2021-11-03 LAB
A/G RATIO: 1.2 (ref 1.1–2.2)
ALBUMIN SERPL-MCNC: 3.4 G/DL (ref 3.4–5)
ALP BLD-CCNC: 85 U/L (ref 40–129)
ALT SERPL-CCNC: 12 U/L (ref 10–40)
ANION GAP SERPL CALCULATED.3IONS-SCNC: 12 MMOL/L (ref 3–16)
APTT: 32.4 SEC (ref 26.2–38.6)
APTT: 59.9 SEC (ref 26.2–38.6)
AST SERPL-CCNC: 14 U/L (ref 15–37)
BILIRUB SERPL-MCNC: <0.2 MG/DL (ref 0–1)
BUN BLDV-MCNC: 9 MG/DL (ref 7–20)
CALCIUM SERPL-MCNC: 7.3 MG/DL (ref 8.3–10.6)
CHLORIDE BLD-SCNC: 105 MMOL/L (ref 99–110)
CO2: 17 MMOL/L (ref 21–32)
CREAT SERPL-MCNC: <0.5 MG/DL (ref 0.6–1.1)
FIBRINOGEN: 362 MG/DL (ref 200–397)
GFR AFRICAN AMERICAN: >60
GFR NON-AFRICAN AMERICAN: >60
GLUCOSE BLD-MCNC: 127 MG/DL (ref 70–99)
HCT VFR BLD CALC: 33.3 % (ref 36–48)
HEMOGLOBIN: 10.8 G/DL (ref 12–16)
INR BLD: 1 (ref 0.88–1.12)
MCH RBC QN AUTO: 27.1 PG (ref 26–34)
MCHC RBC AUTO-ENTMCNC: 32.4 G/DL (ref 31–36)
MCV RBC AUTO: 83.6 FL (ref 80–100)
PDW BLD-RTO: 15.2 % (ref 12.4–15.4)
PLATELET # BLD: 184 K/UL (ref 135–450)
PMV BLD AUTO: 7.8 FL (ref 5–10.5)
POTASSIUM REFLEX MAGNESIUM: 3.7 MMOL/L (ref 3.5–5.1)
PROTHROMBIN TIME: 11.3 SEC (ref 9.9–12.7)
RBC # BLD: 3.99 M/UL (ref 4–5.2)
SARS-COV-2: NOT DETECTED
SODIUM BLD-SCNC: 134 MMOL/L (ref 136–145)
TOTAL PROTEIN: 6.2 G/DL (ref 6.4–8.2)
WBC # BLD: 12.7 K/UL (ref 4–11)

## 2021-11-03 PROCEDURE — 85384 FIBRINOGEN ACTIVITY: CPT

## 2021-11-03 PROCEDURE — 85610 PROTHROMBIN TIME: CPT

## 2021-11-03 PROCEDURE — 6370000000 HC RX 637 (ALT 250 FOR IP): Performed by: PHYSICIAN ASSISTANT

## 2021-11-03 PROCEDURE — 2000000000 HC ICU R&B

## 2021-11-03 PROCEDURE — 85730 THROMBOPLASTIN TIME PARTIAL: CPT

## 2021-11-03 PROCEDURE — 6360000002 HC RX W HCPCS: Performed by: SURGERY

## 2021-11-03 PROCEDURE — 85027 COMPLETE CBC AUTOMATED: CPT

## 2021-11-03 PROCEDURE — 99231 SBSQ HOSP IP/OBS SF/LOW 25: CPT | Performed by: SURGERY

## 2021-11-03 PROCEDURE — 2580000003 HC RX 258: Performed by: INTERNAL MEDICINE

## 2021-11-03 PROCEDURE — 80053 COMPREHEN METABOLIC PANEL: CPT

## 2021-11-03 PROCEDURE — 6370000000 HC RX 637 (ALT 250 FOR IP): Performed by: SURGERY

## 2021-11-03 PROCEDURE — 93970 EXTREMITY STUDY: CPT

## 2021-11-03 PROCEDURE — 6370000000 HC RX 637 (ALT 250 FOR IP): Performed by: INTERNAL MEDICINE

## 2021-11-03 RX ORDER — CALCIUM CARBONATE 200(500)MG
500 TABLET,CHEWABLE ORAL 3 TIMES DAILY PRN
Status: DISCONTINUED | OUTPATIENT
Start: 2021-11-03 | End: 2021-11-04 | Stop reason: HOSPADM

## 2021-11-03 RX ADMIN — HYDROCODONE BITARTRATE AND ACETAMINOPHEN 1 TABLET: 5; 325 TABLET ORAL at 03:57

## 2021-11-03 RX ADMIN — LISINOPRIL 20 MG: 20 TABLET ORAL at 19:42

## 2021-11-03 RX ADMIN — HEPARIN SODIUM 18 UNITS/KG/HR: 10000 INJECTION, SOLUTION INTRAVENOUS at 03:21

## 2021-11-03 RX ADMIN — ANTACID TABLETS 500 MG: 500 TABLET, CHEWABLE ORAL at 21:40

## 2021-11-03 RX ADMIN — Medication 10 ML: at 19:42

## 2021-11-03 RX ADMIN — HEPARIN SODIUM 3180 UNITS: 1000 INJECTION INTRAVENOUS; SUBCUTANEOUS at 09:07

## 2021-11-03 RX ADMIN — RIVAROXABAN 20 MG: 20 TABLET, FILM COATED ORAL at 17:13

## 2021-11-03 ASSESSMENT — PAIN SCALES - GENERAL
PAINLEVEL_OUTOF10: 0
PAINLEVEL_OUTOF10: 0
PAINLEVEL_OUTOF10: 7
PAINLEVEL_OUTOF10: 0
PAINLEVEL_OUTOF10: 8
PAINLEVEL_OUTOF10: 0
PAINLEVEL_OUTOF10: 6
PAINLEVEL_OUTOF10: 8
PAINLEVEL_OUTOF10: 0
PAINLEVEL_OUTOF10: 0

## 2021-11-03 ASSESSMENT — PAIN DESCRIPTION - LOCATION
LOCATION: BACK

## 2021-11-03 ASSESSMENT — PAIN DESCRIPTION - PAIN TYPE
TYPE: ACUTE PAIN

## 2021-11-03 NOTE — FLOWSHEET NOTE
R groin venous sheaths removed. Manual pressure for 20 minutes. Site cleaned and tegaderm applied and  pressure dressing applied as well. Heparin started per MD order, vasyl care, solorio care and partial linen change done. Patient repositioned, 1 pain pill given for back pain.   WCM

## 2021-11-03 NOTE — PROGRESS NOTES
Removed solorio. Stand-by assist to bathroom. Patient sitting up to chair, eating breakfast.  RT groin remains soft, no hematoma or bleeding. Anil pulses +1.  Latha Dutton RN

## 2021-11-03 NOTE — CARE COORDINATION
Discharge Planning Assessment  Readmission risk score 5%  RN discharge planner met with patient/ (and family member) to discuss reason for admission, current living situation, and potential needs at the time of discharge    Demographics/Insurance verified Yes    Current type of dwelling:single level home    Patient from ECF/SW confirmed with:n/a    Living arrangements:with spouse    Level of function/Support:independent, works at [employer] in [email or web address],[facility] Visit to [address] [place] [address] 6 months    DME:none    Active with any community resources/agencies/skilled home care:none     Medication compliance issues: fills prescriptions at [facility] issues that could impact healthcare:not identified    Tentative discharge plan:home    Discussed and provided facilities of choice if transition to a skilled nursing facility is required at the time of discharge      Discussed with patient and/or family that on the day of discharge home tentative time of discharge will be between 10 AM and noon.     Transportation at the time of discharge:     Nadine Dang, CARTERN, CCM, RN  [facility]  [phone]

## 2021-11-03 NOTE — PROGRESS NOTES
VASCULAR    Seen for PE after EKOS thrombolysis. Feels better with less pressure sensation or SOB    VSS (HR 90) O2 sats 98% on 2L O2 NC  Good UO  Lungs clear  R groin - sheaths out with no hematoma    Labs pending    A/P: Acute saddle PE    S/P EKOS catheter directed thrombolysis   Good clinical result. Heparin drip has been restarted. Oral AC (DOAC) per medical service. OK to eat and get up to chair/BR. No further vascular interventions planned at this point.    Will follow     Una Birch

## 2021-11-03 NOTE — PROGRESS NOTES
Pedro Slater   Patient: Princess Gil   11/3/21 2:37 PM   [phone] Hospital or Facility: [facility] From: Jamila Pablo RE: Kaleb Alexander RM: [id number] Copay for patient will be: $437/month for Eliquis and $431/month for Xarelto Need Callback: NO CALLBACK REQ CVU    11/3/21 2:39 PM   I spoke to pharmacist. With a coupon it should be like 10 or 20 bucks and that coupon is good for 12 months    11/3/21 2:46 PM   ok that is better.  Cass Valdez NP ok to start PO today    11/3/21 2:49 PM   Ok I will order soon

## 2021-11-03 NOTE — PROGRESS NOTES
Anahi Monsalve   Patient: Taryn Revel   11/3/21 5:53 PM   positive DVT's BLE  Read 5:54 PM   0'\"   11/3/21 5:54 PM   Ok thanks.  Treatment is the same

## 2021-11-03 NOTE — PLAN OF CARE
Problem: Pain:  Goal: Control of acute pain  Description: Control of acute pain  11/3/2021 1945 by Shaylee Lora RN  Outcome: Ongoing     Problem: Safety:  Goal: Free from accidental physical injury  Description: Free from accidental physical injury  11/3/2021 1945 by Shaylee Lora RN  Outcome: Ongoing     Problem: Daily Care:  Goal: Daily care needs are met  Description: Daily care needs are met  11/3/2021 1945 by Shaylee Lora RN  Outcome: Ongoing

## 2021-11-03 NOTE — PROGRESS NOTES
Dr. Stacy Robison  b/s, ok to remove solorio and get patient up to chair at 7;30, when groin restrictions are over. Right groin remains soft, no hematoma or bleeding, jennifer pulse +1. General diet order placed.   WCM

## 2021-11-03 NOTE — PROGRESS NOTES
[facility] PROGRESS NOTE    11/3/2021 1:24 PM        Name: Tanvi Gomez . Admitted: 11/2/2021  Primary Care Provider: Javi Brady (Tel: [phone])      Subjective:  . Patient feeling much better. Cp or sob. Reviewed interval ancillary notes    Current Medications  ondansetron (ZOFRAN) injection 4 mg, Q6H PRN  lisinopril (PRINIVIL;ZESTRIL) tablet 20 mg, Daily  sodium chloride flush 0.9 % injection 5-40 mL, 2 times per day  sodium chloride flush 0.9 % injection 5-40 mL, PRN  0.9 % sodium chloride infusion, PRN  ondansetron (ZOFRAN-ODT) disintegrating tablet 4 mg, Q8H PRN   Or  ondansetron (ZOFRAN) injection 4 mg, Q6H PRN  polyethylene glycol (GLYCOLAX) packet 17 g, Daily PRN  acetaminophen (TYLENOL) tablet 650 mg, Q6H PRN   Or  acetaminophen (TYLENOL) suppository 650 mg, Q6H PRN  0.9 % sodium chloride infusion, Continuous  heparin 25,000 units in dextrose 5% 250 mL (premix) infusion, Continuous  heparin (porcine) injection 3,180 Units, PRN  heparin 25,000 units in dextrose 5% 250 mL (premix) infusion, Continuous  morphine (PF) injection 2 mg, Q2H PRN   Or  morphine injection 4 mg, Q2H PRN  HYDROcodone-acetaminophen (NORCO) 5-325 MG per tablet 1 tablet, Q6H PRN        Objective:  BP (!) 125/90   Pulse 99   Temp 97.5 °F (36.4 °C) (Temporal)   Resp 18   Ht 5' 3\" (1.6 m)   Wt 175 lb 14.8 oz (79.8 kg)   LMP 10/17/2021   SpO2 93%   BMI 31.16 kg/m²     Intake/Output Summary (Last 24 hours) at 11/3/2021 1324  Last data filed at 11/3/2021 0800  Gross per 24 hour   Intake 50 ml   Output 1100 ml   Net -1050 ml      Wt Readings from Last 3 Encounters:   11/03/21 175 lb 14.8 oz (79.8 kg)       General appearance:  Appears comfortable  Eyes: Sclera clear. Pupils equal.  ENT: Moist oral mucosa. Trachea midline, no adenopathy. Cardiovascular: Regular rhythm, normal S1, S2. No murmur.  No edema in lower extremities  Respiratory: Not using accessory muscles. Good inspiratory effort. Clear to auscultation bilaterally, no wheeze or crackles. GI: Abdomen soft, no tenderness, not distended, normal bowel sounds  Musculoskeletal: No cyanosis in digits, neck supple  Neurology: CN 2-12 grossly intact. No speech or motor deficits  Psych: Normal affect. Alert and oriented in time, place and person  Skin: Warm, dry, normal turgor    Labs and Tests:  CBC:   Recent Labs     11/02/21  1025 11/02/21  1930 11/03/21  0600   WBC 19.2* 13.6* 12.7*   HGB 13.7 11.6* 10.8*    214 184     BMP:    Recent Labs     11/02/21  1025 11/03/21  0600   * 134*   K 4.4 3.7   CL 97* 105   CO2 15* 17*   BUN 13 9   CREATININE 0.7 <0.5*   GLUCOSE 173* 127*     Hepatic:   Recent Labs     11/02/21  1025 11/03/21  0600   AST 19 14*   ALT 15 12   BILITOT 0.5 <0.2   ALKPHOS 113 85         Problem List  Principal Problem:    Acute saddle pulmonary embolism with acute cor pulmonale (HCC)  Active Problems:    Tachycardia    Essential hypertension  Resolved Problems:    * No resolved hospital problems. *       Assessment & Plan:   1. Saddle PE-patient on birth control. No family history of DVT/PE. S/p EKOS. Currently on heparin drip. Transition to DOAC this evening and dc tomorrow. Venous duplex pending. Diet: ADULT DIET;  Regular  Code:Full Code        Nick Winchester PA-C   11/3/2021 1:24 PM

## 2021-11-03 NOTE — CONSULTS
Pharmacy to check patient copays for Eliquis/Xarelto:    Copay for patient will be: $437/month for Eliquis and $431/month for Xarelto    Patient has high deductible health plan. Pharmacy will continue to follow the decision for anticoagulation and  the patient if appropriate.        Jony Camejo, PharmD, [facility]  Clinical Pharmacist  [id number]

## 2021-11-03 NOTE — OP NOTE
[address]                     [facility], [address]                                OPERATIVE REPORT    PATIENT NAME: Niki Zepeda                    :        1975  MED REC NO:   [id number]                          ROOM:       2173  ACCOUNT NO:   [de-identified]                           ADMIT DATE: 2021  PROVIDER:     Yosef Diego MD    DATE OF PROCEDURE:  2021    PREPROCEDURE DIAGNOSES:  Acute pulmonary embolism with saddle thrombus  and right ventricular strain. POSTPROCEDURE DIAGNOSES:  Acute pulmonary embolism with saddle thrombus  and right ventricular strain. PROCEDURES:  1. Ultrasound-guided right femoral venous catheterization. 2.  Pulmonary angiogram via catheter positioned in the main pulmonary  artery. 3.  Insertion of bilateral pulmonary artery thrombolysis catheters  (EKOS) with initiation of thrombolysis therapy. SURGEON:  Yosef Diego MD    ANESTHESIA:  1% lidocaine with conscious sedation (fentanyl 50 mcg and  Versed 1 mg IV push) with continuous oxygen saturation, EKG, and blood  pressure monitoring, administered and monitored by an independent  practitioner under physician direction. HISTORY:  The patient is a 44-year-old lady, who presented to the  emergency room with shortness of breath and dyspnea on exertion. She  was quite tachycardic and was found on CT PA to have a saddle pulmonary  embolism. It was recommended that she undergo catheter-directed  thrombolysis after echocardiogram confirmed evidence of right  ventricular strain suggested on CTA. She agreed understanding the  risks, benefits, and other options. TECHNIQUE:  The patient was brought to the hybrid angiogram room and  placed on the table in the supine position. After adequate induction of  sedation and prepping of the bilateral groins, ultrasound was sterilely  passed on the field.   The right common femoral vein was evaluated and  found to have no evidence of thrombus and was easily compressible. Its  patency was confirmed by visualization and direct compression. Images  were saved for the permanent record. An ultrasound was used to directly  puncture the right common femoral vein after placement of lidocaine in  the subcutaneous tissue. The wire was passed proximally under  fluoroscopic guidance and upsized to a standard 6-Jamaican sheath. Through the sheath was then passed an angled stiff wire, which was  advanced to the right atria followed by a JR4 catheter, which was then  used to direct the wire out the pulmonary outflow tract and into the  main pulmonary artery. Annemarie [place] catheter was advanced into the main  pulmonary artery and a flush pulmonary angiogram was performed, which  confirmed the presence of bilateral pulmonary emboli. The wire and  catheter were then advanced carefully into the right pulmonary artery  and wire was advanced peripherally into the middle lobe area where there  was noted to be significant embolus. The JR4 catheter was removed and  the EKOS infusion catheter was placed over the wire without difficulty  and positioned appropriately under fluoroscopic guidance. The EKOS wire  was then inserted into the catheter and secured in place. Next, a  hollow needle was used to puncture the right femoral vein parallel to  the previous catheter without difficulty. A wire was inserted under  fluoroscopic guidance and a 6-Jamaican sheath was placed. It was then  sutured in place in conjunction with the other catheter. Over the wire  was then placed a JR4 catheter, which was advanced into the right atria  and then used to direct the wire out into the pulmonary outflow tract. The catheter was then used to direct the wire into the left pulmonary  artery in the most dependent segmental portions. The JR4 wire was  removed and the EKOS infusion catheter was advanced over the wire and  positioned appropriately.   Through the catheter was then placed the EKOS  ultrasound wire and secured in place. The coolant fluid was then begun  through both catheters and heparin was initiated through the sheaths. The infusion catheters were then secured in place with a clean sterile  dressing. The patient was transferred to CVU for monitoring. The  patient tolerated the procedure well. FINDINGS:  1.  Bilateral main pulmonary artery embolisms. 2.  Successful initiation of EKOS thrombolysis therapy through bilateral  infusion catheter systems.         Beth Beltran MD    D: 11/02/2021 17:38:34       T: 11/03/2021 2:36:09     [id number]  Job#: [id number]     Doc#: [id number]    CC:

## 2021-11-03 NOTE — PROGRESS NOTES
Per MD order, Ekos system stopped and transducer sheaths x 2 pulled.    2 venous sheaths remain in right groin  No s/s of bleeding, right pedal doppler pulse and left pedal doppler pulse,  jennifer feet are cool but patient says her feet are always cool and declines socks  [facility] AIDAN

## 2021-11-04 VITALS
SYSTOLIC BLOOD PRESSURE: 122 MMHG | DIASTOLIC BLOOD PRESSURE: 86 MMHG | HEART RATE: 102 BPM | WEIGHT: 175.93 LBS | RESPIRATION RATE: 18 BRPM | TEMPERATURE: 97.8 F | BODY MASS INDEX: 31.17 KG/M2 | OXYGEN SATURATION: 97 % | HEIGHT: 63 IN

## 2021-11-04 LAB
HCT VFR BLD CALC: 32.6 % (ref 36–48)
HEMOGLOBIN: 11.1 G/DL (ref 12–16)
MCH RBC QN AUTO: 27.5 PG (ref 26–34)
MCHC RBC AUTO-ENTMCNC: 34 G/DL (ref 31–36)
MCV RBC AUTO: 81.1 FL (ref 80–100)
PDW BLD-RTO: 15.1 % (ref 12.4–15.4)
PLATELET # BLD: 194 K/UL (ref 135–450)
PMV BLD AUTO: 7.7 FL (ref 5–10.5)
RBC # BLD: 4.03 M/UL (ref 4–5.2)
WBC # BLD: 8 K/UL (ref 4–11)

## 2021-11-04 PROCEDURE — 99231 SBSQ HOSP IP/OBS SF/LOW 25: CPT | Performed by: SURGERY

## 2021-11-04 PROCEDURE — 2580000003 HC RX 258: Performed by: INTERNAL MEDICINE

## 2021-11-04 PROCEDURE — 85027 COMPLETE CBC AUTOMATED: CPT

## 2021-11-04 RX ADMIN — Medication 10 ML: at 09:33

## 2021-11-04 ASSESSMENT — PAIN SCALES - GENERAL
PAINLEVEL_OUTOF10: 0
PAINLEVEL_OUTOF10: 0

## 2021-11-04 NOTE — PROGRESS NOTES
VASCULAR    Looks and feels good. Walked without sob. VSS afeb   Off O2  R groin soft    Venous scan + BLE DVT    A/P: S/P PE - good lytic response   DVT BLE - will need 20/30 mmHg KH stockings as outpt to be worn daily. Agree with DOAC managed by PCP for 6 months. Stop estrogen replacement. No need for f/u with me as outpt. Will sign off and see as needed.      Sandra Champagne

## 2021-11-04 NOTE — PROGRESS NOTES
CLINICAL PHARMACY NOTE: MEDS TO BEDS    Total # of Prescriptions Filled: 1   The following medications were delivered to the patient:  · Xarelto starter pack    Additional Documentation:    Patient picked up from Outpatient Pharmacy-signed  Sheridan Davis CPhT

## 2021-11-04 NOTE — CARE COORDINATION
Patient discharged 11/4/2021 to home. Patient has the coupon card for her Makenzie Abide and is aware of her co-pays with use of the card. All discharge needs met per case management.     CARTER LaughlinN, CCM, RN  [facility]  [phone]

## 2021-11-09 NOTE — DISCHARGE SUMMARY
[facility]    [facility] Demographics    Patient. Eric Parker  Date of Birth. 1975  MRN. [id number]     Primary care provider. Beto Allen  (Tel: [phone])    Admit date: 11/2/2021    Discharge date (blank if same as Note Date): 11/4/2021  Note Date: 11/9/2021     Reason for Hospitalization. Chief Complaint   Patient presents with    Palpitations     states she was making breakfast this am, and became dizzy and sweaty, having palpitations    Dizziness         Significant Findings. Principal Problem:    Acute saddle pulmonary embolism with acute cor pulmonale (HCC)  Active Problems:    Tachycardia    Essential hypertension  Resolved Problems:    * No resolved hospital problems. *       Problems and results from this hospitalization that need follow up. 1. DVT and PE    Significant test results and incidental findings. 1. CTPA  Acute pulmonary embolism with a saddle embolus noted across the main   pulmonary artery.       Evidence of right heart strain with the LV to RV ratio 1.7.       No evidence of acute lung parenchyma or pleural disease.         Venous duplex BLE       -Indeterminate age partially occluding deep vein thrombosis involving the    right proximal to mid femoral vein, popliteal vein, and one peroneal vein.    -Indeterminate age totally occluding deep vein thrombosis involving one    right peroneal vein.        -Indeterminate partially occluding deep vein thrombosis involving the left    proximal peroneal vein. Invasive procedures and treatments. 1. Catheter directed EKOS thrombolysis by Dr. Darien Hernandes on November 2    [facility] Course. Patient is a pleasant 62-year-old female on birth control who presented with dizziness nausea and palpitations. In the emergency room CTPA revealed a saddle pulmonary emboli with right heart strain.   EKG revealed sinus tachycardia. Vascular surgery was consulted who recommended patient proceed with EKOS. Patient was admitted, started on heparin, and seen by vascular surgery. She was taken to the Cath Lab where she underwent catheter directed EKOS thrombolysis with good results. She was monitored in the CVU. She was restarted on a heparin drip and transition to Xarelto. She did have a venous duplex of her lower extremities revealing bilateral lower extremity DVTs. Patient has been instructed to stop taking her birth control and to follow-up with her OB regarding other forms of birth control. She will need anticoagulation for at least 6 months. She is also been given a prescription for compression stockings. Patient was discharged home in stable condition. Consults. IP CONSULT TO VASCULAR SURGERY    Physical examination on discharge day. /86   Pulse 102   Temp 97.8 °F (36.6 °C) (Temporal)   Resp 18   Ht 5' 3\" (1.6 m)   Wt 175 lb 14.8 oz (79.8 kg)   LMP 10/17/2021   SpO2 97%   BMI 31.16 kg/m²   General appearance. Alert. Looks comfortable. HEENT. Sclera clear. Moist mucus membranes. Cardiovascular. Regular rate and rhythm, normal S1, S2. No murmur. Respiratory. Not using accessory muscles. Clear to auscultation bilaterally, no wheeze. Gastrointestinal. Abdomen soft, non-tender, not distended, normal bowel sounds  Neurology. Facial symmetry. No speech deficits. Moving all extremities equally. Extremities. No edema in lower extremities. Skin. Warm, dry, normal turgor    Condition at time of discharge stable    Medication instructions provided to patient at discharge. Medication List      START taking these medications    * rivaroxaban 15 & 20 MG Starter Pack  Take as directed on package.   Notes to patient: Rivaroxaban Candice Chu  Use: prevent strokes; prevent and treat blood clots   Side effects: bleeding or bruising more easily; upset stomach     * rivaroxaban 20 MG Tabs tablet  Commonly known as: Xarelto  Take 1 tablet by mouth daily (with breakfast)  Notes to patient: Rivaroxaban Candice Chu  Use: prevent strokes; prevent and treat blood clots   Side effects: bleeding or bruising more easily; upset stomach         * This list has 2 medication(s) that are the same as other medications prescribed for you. Read the directions carefully, and ask your doctor or other care provider to review them with you. CONTINUE taking these medications    cetirizine 10 MG tablet  Commonly known as: ZYRTEC     lisinopril 20 MG tablet  Commonly known as: PRINIVIL;ZESTRIL        STOP taking these medications    SHARRI FE 1/20 PO           Where to Get Your Medications      These medications were sent to [facility], [address] [place] [address], [address] [place] [address] Road    Phone: [phone]   · rivaroxaban 15 & 20 MG Starter Pack     You can get these medications from any pharmacy    Bring a paper prescription for each of these medications  · rivaroxaban 20 MG Tabs tablet         Discharge recommendations given to patient. Follow Up. PCP in 1 week   Disposition. home  Activity. activity as tolerated  Diet: No diet orders on file      Spent 35 minutes in discharge process. Signed:   Braulio Johnston PA-C     11/9/2021 2:08 PM